# Patient Record
(demographics unavailable — no encounter records)

---

## 2024-11-19 NOTE — HISTORY OF PRESENT ILLNESS
[Patient reported mammogram was normal] : Patient reported mammogram was normal [Patient reported PAP Smear was normal] : Patient reported PAP Smear was normal [Patient reported bone density results were normal] : Patient reported bone density results were normal [Patient reported colonoscopy was normal] : Patient reported colonoscopy was normal [Post-Menopause, No Sxs] : post-menopausal, currently without symptoms [Menopause Age: ____] : age at menopause was [unfilled] [Currently Active] : currently active [Men] : men [Vaginal] : vaginal [No] : No [FreeTextEntry1] : 71 y/o  postmenopausal F presents to establish care  +stress incontinence  PGYN: partial hysterectomy at age 40 due to fibroids  POB:   PGYN:  fibroids   Works as  for [employer] (works from home) [Mammogramdate] : 2024 [PapSmeardate] : 2023 [BoneDensityDate] : 2023 [TextBox_37] : normal  [ColonoscopyDate] : 2023 [TextBox_43] : 5 years

## 2024-11-19 NOTE — PLAN
[FreeTextEntry1] : 69 y/o  postmenopausal F presents to establish care  [facility] f/u pap  UTD on all other [facility]  Stress incontinence urogyn referral given   Patient screened for depression- no signs of clinical depression. PHQ-9 scores reviewed over the course of the visit 5-10 minutes of face to face time. Follow up with changes in mood including other symptoms of anxiety

## 2024-11-19 NOTE — PHYSICAL EXAM
[Chaperone Declined] : Patient declined chaperone [[id number]] : A chaperone was present during the pelvic exam. [Appropriately responsive] : appropriately responsive [Alert] : alert [No Acute Distress] : no acute distress [Soft] : soft [Non-tender] : non-tender [Non-distended] : non-distended [Oriented x3] : oriented x3 [Examination Of The Breasts] : a normal appearance [No Masses] : no breast masses were palpable [Labia Majora] : normal [Labia Minora] : normal [Normal] : normal [Absent] : absent [Uterine Adnexae] : non-palpable

## 2024-12-23 NOTE — PROCEDURE
[FreeTextEntry1] : Sterile straight catheterization was performed to measure a postvoid residual volume which was 20 cc. Catheter passed easily without resistance

## 2024-12-23 NOTE — PHYSICAL EXAM
[Chaperone Present] : A chaperone was present in the examining room during all aspects of the physical examination [[id number]] : A chaperone was present during the pelvic exam. [Labia Majora] : were normal [Normal Appearance] : general appearance was normal [Atrophy] : atrophy [No Bleeding] : there was no active vaginal bleeding [Absent] : absent [Normal] : no abnormalities [FreeTextEntry1] : General: Well, appearing. Alert and orientated. No acute distress HEENT: Normocephalic, atraumatic and extraocular muscles appear to be intact  Neck: Full range of motion, no obvious lymphadenopathy, deformities, or masses noted  Respiratory: Speaking in full sentences comfortably, normal work of breathing and no cough during visit Musculoskeletal: active full range of motion in extremities  Extremities: No upper extremity edema noted Skin: no obvious rash or skin lesions Neuro: Orientated X 3, speech is fluent, normal rate Psych: Normal mood and affect    [Tenderness] : ~T no ~M abdominal tenderness observed [Distended] : not distended [de-identified] : no prolapse

## 2024-12-23 NOTE — HISTORY OF PRESENT ILLNESS
[Rectal Prolapse] : no [x1] : nocturia once nightly [Urinary Tract Infection] : no [] : yes [Stool Visible Blood] : no [Incomplete Emptying Of Stool] : no [Unable To Restrain Bowel Movement] : mild [Urinary Frequency] : mild [de-identified] : 7x [de-identified] : occasional [FreeTextEntry1] :  h/o YAA for fibroids

## 2024-12-23 NOTE — DISCUSSION/SUMMARY
[FreeTextEntry1] :   We reviewed management options for stress urinary incontinence including: observation, pelvic floor exercises, continence devices, periurethral bulking agents,  and surgical management. We discussed surgical management options and written information on stress urinary incontinence including management options from [facility] was provided to her and reviewed. We reviewed risks and benefits of each procedure. She would like to try pelvic floor PT, Rx provided with contact information for local providers. Written instructions on how to perform the exercises were also provided to her. RTO in 6 mo for follow up, or sooner if issues arise.